# Patient Record
Sex: FEMALE | Race: WHITE | NOT HISPANIC OR LATINO | URBAN - METROPOLITAN AREA
[De-identification: names, ages, dates, MRNs, and addresses within clinical notes are randomized per-mention and may not be internally consistent; named-entity substitution may affect disease eponyms.]

---

## 2023-07-06 ENCOUNTER — HOSPITAL ENCOUNTER (OUTPATIENT)
Dept: DATA CONVERSION | Facility: HOSPITAL | Age: 28
End: 2023-07-06
Attending: STUDENT IN AN ORGANIZED HEALTH CARE EDUCATION/TRAINING PROGRAM | Admitting: STUDENT IN AN ORGANIZED HEALTH CARE EDUCATION/TRAINING PROGRAM

## 2023-07-06 DIAGNOSIS — S83.281A OTHER TEAR OF LATERAL MENISCUS, CURRENT INJURY, RIGHT KNEE, INITIAL ENCOUNTER: ICD-10-CM

## 2023-07-06 DIAGNOSIS — S83.519A SPRAIN OF ANTERIOR CRUCIATE LIGAMENT OF UNSPECIFIED KNEE, INITIAL ENCOUNTER: ICD-10-CM

## 2023-07-06 DIAGNOSIS — S83.511A SPRAIN OF ANTERIOR CRUCIATE LIGAMENT OF RIGHT KNEE, INITIAL ENCOUNTER: ICD-10-CM

## 2023-09-30 NOTE — H&P
History & Physical Reviewed:   Pregnant/Lactating:  ·  Are You Pregnant no   ·  Are You Currently Breastfeeding no     I have reviewed the History and Physical dated:  26-Jun-2023   History and Physical reviewed and relevant findings noted. Patient examined to review pertinent physical  findings.: No significant changes   Home Medications Reviewed: no changes noted   Allergies Reviewed: no changes noted       ERAS (Enhanced Recovery After Surgery):  ·  ERAS Patient: no     Consent:   COVID-19 Consent:  ·  COVID-19 Risk Consent Surgeon has reviewed key risks related to the risk of murali COVID-19 and if they contract COVID-19 what the risks are.     Attestation:   Note Completion:  I am a:  Resident/Fellow   Attending Attestation I saw and evaluated the patient.  I personally obtained the key and critical portions of the history and physical exam or was physically present for key and  critical portions performed by the resident/fellow. I reviewed the resident/fellow?s documentation and discussed the patient with the resident/fellow.  I agree with the resident/fellow?s medical decision making as documented in the note.     I personally evaluated the patient on 06-Jul-2023         Electronic Signatures:  Joe Maldonado (DO (Resident))  (Signed 06-Jul-2023 05:56)   Authored: History & Physical Reviewed, ERAS, Consent,  Note Completion  Popeye Rowe)  (Signed 06-Jul-2023 15:17)   Authored: Note Completion   Co-Signer: History & Physical Reviewed, ERAS, Consent, Note Completion      Last Updated: 06-Jul-2023 15:17 by Popeye Rowe)

## 2023-10-02 NOTE — OP NOTE
Post Operative Note:     PreOp Diagnosis: Anterior cruciate ligament tear,  lateral meniscus tear   Post-Procedure Diagnosis: same as PreOp   Procedure: 1.  Arthroscopic right anterior cruciate  ligament reconstruction with bone-patellar-bone autograft   2.  Arthroscopic right knee lateral meniscus repair   3.   4.   5.   Surgeon: Dr. Rowe   Resident/Fellow/Other Assistant: Joel Gordon   Anesthesia: general   I.V. Fluids: see anesthesia note   Estimated Blood Loss (mL): 5   Blood Replacement: none   Specimen: no   Complications: none   Findings: see post procedural note   Patient Returned To/Condition: returned stable to  PACU   Urine Output: see anesthesia note   Drains and/or Catheters: none   Tourniquet Times: 72 minutes     Operative Report Dictated:  Dictation: no     Attestation:   Note Completion:  I am a: Resident/Fellow   Attending Attestation I was present for the entire procedure          Electronic Signatures:  Joe Maldonado (Resident))  (Signed 06-Jul-2023 15:37)   Authored: Post Operative Note, Note Completion  Popeye Rowe)  (Signed 20-Jul-2023 08:45)   Authored: Note Completion   Co-Signer: Post Operative Note, Note Completion      Last Updated: 20-Jul-2023 08:45 by Popeye Rowe)

## 2024-05-06 NOTE — OP NOTE
PREOPERATIVE DIAGNOSIS:  Right knee anterior cruciate ligament tear.    POSTOPERATIVE DIAGNOSIS:  1. Right knee anterior cruciate ligament tear.  2. Lateral meniscus tear.    OPERATION/PROCEDURE:    SURGEON:  Popeye Rowe MD.    ASSISTANT(S):  Fidel Gordon MD, Orthopedic resident.    ANESTHESIA:  General plus regional.    COMPLICATIONS:  None.    INDICATIONS FOR PROCEDURE:  The patient is a 27-year-old female, who sustained a soccer injury  resulting in anterior cruciate ligament rupture.  She was referred to  me for surgical treatment.  Given her age and activity level, she  elected to proceed with ACL reconstruction and reviewed the risks of  surgery including, but not limited to, bleeding; infection; injury to  surrounding tissues, nerves, vessels; DVT/PE; re-rupture; recurrent  instability; and the potential need for future surgery.  She  understood fully and wished to proceed.     PROCEDURE IN DETAIL:  The patient was met in the preoperative holding area.  Right knee  identified as correct operative limb by myself, the patient as well  as the attending anesthesiologist, was marked with an indelible  marker.  Informed consent was in the chart.  She received a regional  nerve block by Anesthesia.  She was taken back to the OR, placed  supine on the operating bed in stable condition.  At this point, a  formal interdisciplinary huddle was carried out, correctly  identifying the patient, procedure, correct operative limb, and all  necessary equipment and all were in agreement.  She was surrendered  under general anesthetic.  Airway was secured with an LMA tube.  Once  she was asleep, examination under anesthesia demonstrated she had a  positive Lachman and a grade 2 pivot shift.  Her anterior drawer did  not increase in internal rotation.  A nonsterile thigh tourniquet was  placed and the right leg was then prepped and draped in a standard  sterile fashion.  She received Ancef for antibiotics.  A  time-out was  called.  Limb was exsanguinated, tourniquet inflated.  Began with  graft harvest with a 5.5 cm incision from the inferior pole of the  patella down to the tibial tubercle.  This was taken sharply down to  subcutaneous tissues.  Full-thickness skin flaps were developed.  The  paratenon was incised in midline.  A central 10 mm strip of the  tendon was harvested up to the patella down to the tibia tubercle,  and then a 10 x 10 bone block was harvested off the tibia and the  patella, it was taken to the back table and prepped for a 10  femoral-sided graft and a 10.5 tibial-sided graft.  An anterolateral,  anteromedial, and superior medial outflow portal was established and  diagnostic arthroscopy ensued.  No loose bodies in medial or lateral  gutter or suprapatellar pouch.  The patellofemoral joint was  pristine.  The anteromedial compartment and the femoral tibial  cartilage were tacked.  The medial meniscus was intact.  We entered  the notch.  The ACL was completely ruptured off the femoral origin.  The PCL was intact.  We entered the lateral compartment.  The femoral  tibial cartilage was intact.  There was an inner edge longitudinal  tear in the white-white zone that was not repairable.  This was  resected with the biter back to a stable rim.  Additionally, there  was a posterior meniscal capsule junction tear longitudinal, roughly  a centimeter that was unstable.  This was indicated for repair, and  after the meniscal interface was roughened, it was repaired with a  DePuy TRUESPAN all-inside repair device.  This had excellent  fixation.  We then placed the knee in 90 degrees of flexion and  entered the notch.  The ACL stump was then resected.  A small  notchplasty was completed.  Then, using the FlipCutter guide, a 10 mm  tunnel was drilled in the femoral lateral wall.  We then drilled a  10.5 tunnel in the tibia through our anteromedial incision.  Our  graft was passed then up through the tibial  tunnel and docked in the  femoral side and affixed there with a 7 x 20 mm Arthrex FastThread  bioabsorbable screw.  This had excellent fixation.  With tension on  the graft, the knee was cycled 20 times.  The tibial fixation was  achieved with a 10 x 23 Savana bioabsorbable screw at 30 degrees of  knee flexion, a posterior drawer force that had excellent fixation.  Scope was placed back in the knee, assuring appropriate graft  placement and no intra-articular hardware.  Debris and fluid were  evacuated from the joint.  Lachman was negative.  Pivot shift was  negative.  The patella and tibia were bone grafted to the patellar  tendon, closed with 0 Vicryl, patellar tendon with 2-0 Vicryl  subcuticular, and 3-0 Monocryl for the skin and portals.  At the end  of the case, all sponge, needle, and instrument counts were correct.  The tourniquet was deflated for around 70 minutes.  A sterile  dressing followed by thigh-high BENJY hose and a hinged knee brace  locked in full extension.  She was then extubated and taken to PACU  without complication.     POSTOPERATIVE PLAN:  The patient will be discharged home.  She is weightbearing as  tolerated in full extension for 4 weeks due to the meniscus repair.  I will see her in the office in a week for wound check.       Popeye Rowe MD    DD:  07/06/2023 12:14:45 EST  DT:  07/06/2023 13:22:34 EST  DICTATION NUMBER:  [id number]  INTERNAL JOB NUMBER:  [id number]    CC:  Popeye Rowe MD, Fax: [phone]  DEBBIE SANTAMARIA        Electronic Signatures:  Popeye Rowe) (Signed on 06-Jul-2023 15:17)   Authored  Unsigned, Draft (SYS GENERATED) (Entered on 06-Jul-2023 13:22)   Entered    Last Updated: 06-Jul-2023 15:17 by Popeye Rowe)

## 2025-02-05 ENCOUNTER — TELEPHONE (OUTPATIENT)
Dept: OBSTETRICS AND GYNECOLOGY | Facility: CLINIC | Age: 30
End: 2025-02-05
Payer: COMMERCIAL

## 2025-02-17 ENCOUNTER — APPOINTMENT (OUTPATIENT)
Dept: OBSTETRICS AND GYNECOLOGY | Facility: CLINIC | Age: 30
End: 2025-02-17
Payer: COMMERCIAL

## 2025-02-17 VITALS
DIASTOLIC BLOOD PRESSURE: 62 MMHG | SYSTOLIC BLOOD PRESSURE: 112 MMHG | BODY MASS INDEX: 23.4 KG/M2 | WEIGHT: 158 LBS | HEIGHT: 69 IN

## 2025-02-17 DIAGNOSIS — Z12.4 ROUTINE CERVICAL SMEAR: ICD-10-CM

## 2025-02-17 DIAGNOSIS — Z01.419 WELL WOMAN EXAM WITH ROUTINE GYNECOLOGICAL EXAM: Primary | ICD-10-CM

## 2025-02-17 DIAGNOSIS — Z11.3 SCREEN FOR STD (SEXUALLY TRANSMITTED DISEASE): ICD-10-CM

## 2025-02-17 PROCEDURE — 3008F BODY MASS INDEX DOCD: CPT | Performed by: OBSTETRICS & GYNECOLOGY

## 2025-02-17 PROCEDURE — 87591 N.GONORRHOEAE DNA AMP PROB: CPT

## 2025-02-17 PROCEDURE — 88175 CYTOPATH C/V AUTO FLUID REDO: CPT

## 2025-02-17 PROCEDURE — 87491 CHLMYD TRACH DNA AMP PROBE: CPT

## 2025-02-17 PROCEDURE — 99395 PREV VISIT EST AGE 18-39: CPT | Performed by: OBSTETRICS & GYNECOLOGY

## 2025-02-17 RX ORDER — LEVONORGESTREL 52 MG/1
INTRAUTERINE DEVICE INTRAUTERINE
COMMUNITY

## 2025-02-18 LAB
C TRACH RRNA SPEC QL NAA+PROBE: NEGATIVE
N GONORRHOEA DNA SPEC QL PROBE+SIG AMP: NEGATIVE

## 2025-03-04 LAB
CYTOLOGY CMNT CVX/VAG CYTO-IMP: NORMAL
LAB AP HPV GENOTYPE QUESTION: YES
LAB AP HPV HR: NORMAL
LAB AP PAP ADDITIONAL TESTS: NORMAL
LABORATORY COMMENT REPORT: NORMAL
PATH REPORT.TOTAL CANCER: NORMAL

## 2025-03-13 ENCOUNTER — TELEPHONE (OUTPATIENT)
Dept: OBSTETRICS AND GYNECOLOGY | Facility: CLINIC | Age: 30
End: 2025-03-13
Payer: COMMERCIAL